# Patient Record
Sex: MALE | Race: OTHER
[De-identification: names, ages, dates, MRNs, and addresses within clinical notes are randomized per-mention and may not be internally consistent; named-entity substitution may affect disease eponyms.]

---

## 2019-04-25 ENCOUNTER — HOSPITAL ENCOUNTER (EMERGENCY)
Dept: HOSPITAL 72 - EMR | Age: 44
LOS: 1 days | Discharge: OTHER INSTITUTION | End: 2019-04-26
Payer: COMMERCIAL

## 2019-04-25 VITALS — DIASTOLIC BLOOD PRESSURE: 87 MMHG | SYSTOLIC BLOOD PRESSURE: 128 MMHG

## 2019-04-25 VITALS — DIASTOLIC BLOOD PRESSURE: 76 MMHG | SYSTOLIC BLOOD PRESSURE: 118 MMHG

## 2019-04-25 VITALS — DIASTOLIC BLOOD PRESSURE: 86 MMHG | SYSTOLIC BLOOD PRESSURE: 121 MMHG

## 2019-04-25 VITALS — HEIGHT: 70 IN | WEIGHT: 198 LBS | BODY MASS INDEX: 28.35 KG/M2

## 2019-04-25 VITALS — DIASTOLIC BLOOD PRESSURE: 76 MMHG | SYSTOLIC BLOOD PRESSURE: 132 MMHG

## 2019-04-25 VITALS — SYSTOLIC BLOOD PRESSURE: 119 MMHG | DIASTOLIC BLOOD PRESSURE: 91 MMHG

## 2019-04-25 DIAGNOSIS — N17.9: ICD-10-CM

## 2019-04-25 DIAGNOSIS — K57.30: ICD-10-CM

## 2019-04-25 DIAGNOSIS — R19.7: ICD-10-CM

## 2019-04-25 DIAGNOSIS — I10: ICD-10-CM

## 2019-04-25 DIAGNOSIS — R11.2: Primary | ICD-10-CM

## 2019-04-25 LAB
ADD MANUAL DIFF: YES
ALBUMIN SERPL-MCNC: 3.4 G/DL (ref 3.4–5)
ALBUMIN/GLOB SERPL: 0.6 {RATIO} (ref 1–2.7)
ALP SERPL-CCNC: 74 U/L (ref 46–116)
ALT SERPL-CCNC: 16 U/L (ref 12–78)
ANION GAP SERPL CALC-SCNC: 16 MMOL/L (ref 5–15)
APPEARANCE UR: (no result)
APTT PPP: (no result) S
AST SERPL-CCNC: 24 U/L (ref 15–37)
BILIRUB SERPL-MCNC: 0.8 MG/DL (ref 0.2–1)
BUN SERPL-MCNC: 19 MG/DL (ref 7–18)
CALCIUM SERPL-MCNC: 8.8 MG/DL (ref 8.5–10.1)
CHLORIDE SERPL-SCNC: 93 MMOL/L (ref 98–107)
CO2 SERPL-SCNC: 23 MMOL/L (ref 21–32)
CREAT SERPL-MCNC: 2.8 MG/DL (ref 0.55–1.3)
ERYTHROCYTE [DISTWIDTH] IN BLOOD BY AUTOMATED COUNT: 11.6 % (ref 11.6–14.8)
GLOBULIN SER-MCNC: 5.8 G/DL
GLUCOSE UR STRIP-MCNC: NEGATIVE MG/DL
HCT VFR BLD CALC: 53.2 % (ref 42–52)
HGB BLD-MCNC: 18.1 G/DL (ref 14.2–18)
KETONES UR QL STRIP: (no result)
LEUKOCYTE ESTERASE UR QL STRIP: NEGATIVE
MCV RBC AUTO: 89 FL (ref 80–99)
NITRITE UR QL STRIP: NEGATIVE
PH UR STRIP: 6 [PH] (ref 4.5–8)
PLATELET # BLD: 181 K/UL (ref 150–450)
POTASSIUM SERPL-SCNC: 2.9 MMOL/L (ref 3.5–5.1)
PROT UR QL STRIP: (no result)
RBC # BLD AUTO: 5.97 M/UL (ref 4.7–6.1)
SODIUM SERPL-SCNC: 132 MMOL/L (ref 136–145)
SP GR UR STRIP: 1.01 (ref 1–1.03)
UROBILINOGEN UR-MCNC: NORMAL MG/DL (ref 0–1)
WBC # BLD AUTO: 8 K/UL (ref 4.8–10.8)

## 2019-04-25 PROCEDURE — 84484 ASSAY OF TROPONIN QUANT: CPT

## 2019-04-25 PROCEDURE — 85007 BL SMEAR W/DIFF WBC COUNT: CPT

## 2019-04-25 PROCEDURE — 96375 TX/PRO/DX INJ NEW DRUG ADDON: CPT

## 2019-04-25 PROCEDURE — 80053 COMPREHEN METABOLIC PANEL: CPT

## 2019-04-25 PROCEDURE — 76700 US EXAM ABDOM COMPLETE: CPT

## 2019-04-25 PROCEDURE — 85025 COMPLETE CBC W/AUTO DIFF WBC: CPT

## 2019-04-25 PROCEDURE — 71045 X-RAY EXAM CHEST 1 VIEW: CPT

## 2019-04-25 PROCEDURE — 36415 COLL VENOUS BLD VENIPUNCTURE: CPT

## 2019-04-25 PROCEDURE — 96365 THER/PROPH/DIAG IV INF INIT: CPT

## 2019-04-25 PROCEDURE — 74176 CT ABD & PELVIS W/O CONTRAST: CPT

## 2019-04-25 PROCEDURE — 93005 ELECTROCARDIOGRAM TRACING: CPT

## 2019-04-25 PROCEDURE — 81003 URINALYSIS AUTO W/O SCOPE: CPT

## 2019-04-25 PROCEDURE — 96361 HYDRATE IV INFUSION ADD-ON: CPT

## 2019-04-25 PROCEDURE — 83690 ASSAY OF LIPASE: CPT

## 2019-04-25 PROCEDURE — 99285 EMERGENCY DEPT VISIT HI MDM: CPT

## 2019-04-25 PROCEDURE — 96366 THER/PROPH/DIAG IV INF ADDON: CPT

## 2019-04-25 NOTE — DIAGNOSTIC IMAGING REPORT
Indication: Abdominal pain, vomiting, abnormal renal function tests

 

Technique: Gray-scale and duplex images of the upper abdomen were obtained

 

Comparison: none

 

Findings: Exam somewhat limited by overlying bowel gas and patient inability to

suspend respiration   Gallbladder is unremarkable, without stones, wall thickening,

nor pericholecystic fluid. There is some sludge present.  Sonographic Waters's sign

is negative.  Common bile duct measures 5 mm in diameter.  No intrahepatic biliary

ductal dilatation.  Liver demonstrates normal echogenicity, no focal abnormality.  

Portal vein and hepatic veins are patent.   Pancreas is incompletely visualized due

to overlying bowel gas, visualized portions are unremarkable.    Spleen is

unremarkable.  Left kidney measures 11.6 cm in length.  Right kidney measures 11.4 cm

length.  Both kidneys demonstrate normal echogenicity.  There is no hydronephrosis.  

No focal abnormality . Abdominal aorta is partially obscured by bowel gas, visualized

portions are non-aneurysmal .

 

Impression: Somewhat limited exam, as described. Note obscuration of portions of the

pancreas and abdominal aorta

 

Gallbladder sludge. Negative for stones or dilated ducts

 

No other significant abnormality

## 2019-04-25 NOTE — EMERGENCY ROOM REPORT
History of Present Illness


General


Chief Complaint:  Nausea, Vomiting, and Diarrhea


Source:  Patient





Present Illness


HPI


43-year-old male with no medical problems, no surgical history, no history of 

tobacco alcohol or drug use, presents with nausea vomiting abdominal pain, 

reports she's only vomited once but has been having diarrhea for the last few 

days, reports the pain is periumbilical, achy, with subjective fevers as well, 

denies urinary symptoms, reports pain is intermittent, no change with by mouth 

intake.  Reports anorexia.


Allergies:  


Coded Allergies:  


     No Known Allergies (Unverified , 4/25/19)





Patient History


Past Medical History:  see triage record


Reviewed Nursing Documentation:  PMH: Agreed; PSxH: Agreed





Nursing Documentation-PMH


Past Medical History:  No History, Except For


Hx Hypertension:  Yes





Review of Systems


All Other Systems:  negative except mentioned in HPI





Physical Exam





Vital Signs








  Date Time  Temp Pulse Resp B/P (MAP) Pulse Ox O2 Delivery O2 Flow Rate FiO2


 


4/25/19 11:13 99.0 97 22 155/98 97   


 


4/25/19 12:16      Room Air  








Sp02 EP Interpretation:  reviewed, normal


General Appearance:  no apparent distress, alert, non-toxic


Head:  normocephalic


Eyes:  bilateral eye normal inspection, bilateral eye PERRL, bilateral eye EOMI


ENT:  normal ENT inspection, hearing grossly normal, normal pharynx, no 

angioedema, normal voice, dry mucus membranes


Neck:  normal inspection, full range of motion, supple, supple/symm/no masses


Respiratory:  chest non-tender, lungs clear, normal breath sounds, chest 

symmetrical, palpation of chest normal


Cardiovascular #1:  normal peripheral pulses, regular rate, rhythm


Cardiovascular #2:  2+ radial (R), 2+ radial (L)


Gastrointestinal:  normal inspection, non tender, soft, no mass, no guarding, 

no rebound


Rectal:  deferred


Genitourinary:  normal inspection, no CVA tenderness


Musculoskeletal:  back normal, gait/station normal, normal range of motion, non-

tender, no calf tenderness


Neurologic:  alert, responsive, CNs III-XII nml as tested, motor strength/tone 

normal, sensory intact, speech normal


Psychiatric:  judgement/insight normal, memory normal, mood/affect normal


Skin:  normal color, no rash, warm/dry, normal turgor


Lymphatic:  no adenopathy





Medical Decision Making


Diagnostic Impression:  


 Primary Impression:  


 Nausea, vomiting, and diarrhea


 Additional Impression:  


 Acute kidney injury


ER Course


Patient with no medical problems presents with acute kidney injury, dehydration

, left-sided abnormalities, given IV fluids, potassium, will admit.





Abdominal ultrasound is unremarkable, as is chest x-ray


EKG Diagnostic Results


EKG Time:  11:34


EP Interpretation:  no stemi


Rate:  normal


Rhythm:  NSR


ST Segments:  no acute changes


ASA given to the pt in ED:  No





Rhythm Strip Diag. Results


Rhythm Strip Time:  13:09


EP Interpretation:  yes


Rate:  100


Rhythm:  NSR, no PVC's, no ectopy





Last Vital Signs








  Date Time  Temp Pulse Resp B/P (MAP) Pulse Ox O2 Delivery O2 Flow Rate FiO2


 


4/25/19 12:34 99.0       


 


4/25/19 12:16  118 18 119/91 100 Room Air  








Disposition:  ADMITTED AS INPATIENT


Condition:  Stable


Referrals:  


[facility],REFERRING (PCP)











JAYME THOMAS M.D Apr 25, 2019 13:09

## 2019-04-25 NOTE — NUR
ED Nurse Note:



report given to ambulance staff at the bedside, pt getting transferred to [facility] to continue care. vss. iv intact and patent.

## 2019-04-25 NOTE — DIAGNOSTIC IMAGING REPORT
Indication: Abdominal pain, nausea, vomiting

 

Technique: Spiral acquisitions obtained through the abdomen and pelvis. No oral

contrast utilized, per emergency room physician request No IV contrast utilized,  per

referring physician request.. Multiplanar reconstructions were generated. Total dose

length product 808.38 mGycm. CTDIvol(s) 15.7 mGy. Dose reduction achieved using

automated exposure control

 

 

Comparison: None

 

Findings: Lack of enteric contrast limits assessment of the GI tract.

 

The ascending colon is prominent, fluid-filled, equivocally mildly thick walled.

There is definite wall thickening of the transverse colon, with infiltration of the

pericolonic fat. There is slight prominence of the terminal ileum and possible

minimal wall thickening of the distal ileum. Prominent lymph nodes are seen in the

right lower quadrant mesentery.

 

There is distal colonic diverticulosis. Of note, diverticula are not seen in the area

of wall thickening. The appendix is normal. No small bowel distention. No free or

loculated intraperitoneal gas or fluid is evident.

 

Lack of IV contrast limits assessment of solid organs. The liver is equivocal is

slightly hypoattenuating. No focal abnormality. The gallbladder is unremarkable;

sludge seen on recent sonogram is not visible. No biliary ductal dilatation. The

pancreas, spleen, adrenals, kidneys are all unremarkable. No renal or ureteral

calculi, hydronephrosis, or hydroureter. A retroaortic left renal vein is

incidentally noted. No retroperitoneal or mesenteric mass or adenopathy. No pelvic

mass or adenopathy. The included lung bases are clear. The bones demonstrate mild

degenerative change at the lumbosacral junction

 

Impression: Wall thickening of the transverse colon and possibly the ascending colon.

This is suspicious for colitis, nonspecific as regards etiology

 

There may be some wall thickening of the distal ileum is well, could represent

associated ileitis.

 

Colonic diverticulosis. No evidence of diverticulitis

 

Prominent right lower quadrant lymph nodes, likely reactive related to the above

 

Incidental finding of degenerative spondylosis, retroaortic left renal vein-normal

anatomic variant

 

 

 

 

 

The CT scanner at [facility] is accredited by the [employer]

[employer]iology and the scans are performed using protocols designed to limit radiation

exposure to as low as reasonably achievable to attain images of sufficient resolution

adequate for diagnostic evaluation.

## 2019-04-25 NOTE — NUR
ED Nurse Note:



pt reports headache, ERMD notified, tylenol 650mg po order received, verified 
w/ ERMD for oral fluids.

## 2019-04-25 NOTE — NUR
ED Nurse Note:





pt walked in c/o abd pain since tuesday night, reports diarrhea and nausea, 
denies vomiting. pt reports chills and states he had fever before. pt AA&ox4, 
skin warm and dry, resp even and unlabored on RA, reports nausea, no active v/d 
at this time, active BS, abd soft nontender nondistended, ambulates w/ steady 
gait, vss, will cont monitor.

## 2019-04-26 NOTE — CARDIOLOGY REPORT
--------------- APPROVED REPORT --------------





EKG Measurement

Heart [id number]

MS [id number]

[id number]

[id number]

[id number]





Sinus tachycardia

Possible Left atrial enlargement



Possible inferior infarct, age undetermined

Abnormal ECG

## 2019-07-11 ENCOUNTER — HOSPITAL ENCOUNTER (EMERGENCY)
Dept: HOSPITAL 72 - EMR | Age: 44
Discharge: HOME | End: 2019-07-11
Payer: COMMERCIAL

## 2019-07-11 VITALS — WEIGHT: 190 LBS | BODY MASS INDEX: 28.14 KG/M2 | HEIGHT: 69 IN

## 2019-07-11 VITALS — DIASTOLIC BLOOD PRESSURE: 93 MMHG | SYSTOLIC BLOOD PRESSURE: 132 MMHG

## 2019-07-11 DIAGNOSIS — I10: ICD-10-CM

## 2019-07-11 DIAGNOSIS — L03.114: Primary | ICD-10-CM

## 2019-07-11 PROCEDURE — 99282 EMERGENCY DEPT VISIT SF MDM: CPT

## 2019-07-11 NOTE — NUR
ER DISCHARGE NOTE:

Patient is cleared to be discharged per ERMD, pt is aox4, on room air, with 
stable vital signs. pt was given dc and prescription instructions, pt was able 
to verbalize understanding, pt id band REMOVED. pt is able to ambulate with 
steady gait. pt took all belongings.

## 2019-07-11 NOTE — EMERGENCY ROOM REPORT
History of Present Illness


General


Chief Complaint:  Skin Rash/Abscess


Source:  Patient





Present Illness


[facility]


This is a 43-year-old male with no past medical history except for high blood 

pressure.  Since with chief complaint of spider bite.  He denies any spider 

biting him.  He woke up with rash on his arm for the last 2 days.  Now seems to 

be spreading.  Itching in nature.  No fever chills.  No nausea no vomiting.  

Worse with scratching.  No discharge.


Allergies:  


Coded Allergies:  


     No Known Allergies (Unverified , 4/25/19)





Patient History


Past Medical History:  see triage record, old chart reviewed


Past Surgical History:  none


Pertinent Family History:  none


Social History:  Denies: smoking


Immunizations:  other


Reviewed Nursing Documentation:  PMH: Agreed; PSxH: Agreed





Nursing Documentation-PMH


Hx Hypertension:  Yes


Hx Gastrointestinal Problems:  Yes - colitis





Review of Systems


Eye:  Denies: eye pain, blurred vision


ENT:  Denies: ear pain, nose congestion, throat swelling


Respiratory:  Denies: cough, shortness of breath


Cardiovascular:  Denies: chest pain, palpitations


Gastrointestinal:  Denies: abdominal pain, diarrhea, nausea, vomiting


Musculoskeletal:  Denies: back pain, joint pain


Skin:  Reports: rash


Neurological:  Denies: headache, numbness


Endocrine:  Denies: increased thirst, increased urine


Hematologic/Lymphatic:  Denies: easy bruising


All Other Systems:  negative except mentioned in HPI





Physical Exam





Vital Signs








  Date Time  Temp Pulse Resp B/P (MAP) Pulse Ox O2 Delivery O2 Flow Rate FiO2


 


7/11/19 22:48 98.4 82 17 132/93 (106) 95 Room Air  





Vitals unremarkable


Sp02 EP Interpretation:  reviewed, normal


General Appearance:  well appearing, no apparent distress, alert


Head:  normocephalic, atraumatic


Eyes:  bilateral eye PERRL, bilateral eye EOMI


ENT:  hearing grossly normal, normal pharynx


Neck:  full range of motion, supple, no meningismus


Respiratory:  chest non-tender, lungs clear, normal breath sounds


Cardiovascular #1:  regular rate, rhythm, no murmur


Gastrointestinal:  normal bowel sounds, non tender, no mass, no organomegaly, 

no bruit, non-distended


Musculoskeletal:  back normal, gait/station normal, normal range of motion


Neurologic:  alert, oriented x3


Psychiatric:  mood/affect normal


Skin:  rash - Scatter 1 cm erythematous rash on his left upper extremity.  No 

purulent discharge.  No crepitance.





Medical Decision Making


Diagnostic Impression:  


 Primary Impression:  


 Cellulitis


 Qualified Codes:  L03.114 - Cellulitis of left upper limb


ER Course


Patient presents with cellulitis of his upper extremities, mostly left side.  

No evidence of any abscess or necrotizing fasciitis.  Most likely MRSA.





Last Vital Signs








  Date Time  Temp Pulse Resp B/P (MAP) Pulse Ox O2 Delivery O2 Flow Rate FiO2


 


7/11/19 22:48 98.4 82 17 132/93 (106) 95 Room Air  








Status:  improved


Disposition:  HOME, SELF-CARE


Condition:  Stable


Scripts


Mupirocin* (MUPIROCIN*) 22 Gm Oint...g.


1 APPLIC TOPIC THREE TIMES A DAY, #22 GM


   Prov: Maurilio Alston MD         7/11/19 


Trimethoprim/Sulfamethoxazole 160/800* (BACTRIM DS TABLET*) 1 Each Tablet


1 TAB ORAL Q12H, #14 TAB 0 Refills


   Prov: Maurilio Alston MD         7/11/19





Additional Instructions:  


Clean area with hydrogen peroxide first.  Then apply antibiotic ointment.  

Follow-up with your doctor in 7 days for recheck.  Return if worse.











Maurilio Alston MD Jul 11, 2019 23:14

## 2019-07-11 NOTE — NUR
ED Nurse Note:

PT CAME TO ED FROM HOME C/O BUG BITES ALL OVER HIS BODY. PT'S SKIN IS RED AND 
ENFLAMED. PT'S SCLERA IS RED AS WELL. pT IS A&OX4, VSS